# Patient Record
Sex: FEMALE | Race: BLACK OR AFRICAN AMERICAN | HISPANIC OR LATINO | URBAN - METROPOLITAN AREA
[De-identification: names, ages, dates, MRNs, and addresses within clinical notes are randomized per-mention and may not be internally consistent; named-entity substitution may affect disease eponyms.]

---

## 2022-05-30 ENCOUNTER — INPATIENT (INPATIENT)
Facility: HOSPITAL | Age: 27
LOS: 1 days | Discharge: HOME | DRG: 341 | End: 2022-06-01
Attending: SURGERY | Admitting: SURGERY
Payer: COMMERCIAL

## 2022-05-30 VITALS
HEART RATE: 68 BPM | OXYGEN SATURATION: 96 % | HEIGHT: 63 IN | DIASTOLIC BLOOD PRESSURE: 72 MMHG | SYSTOLIC BLOOD PRESSURE: 117 MMHG | WEIGHT: 192.02 LBS | RESPIRATION RATE: 18 BRPM | TEMPERATURE: 98 F

## 2022-05-30 LAB
ALBUMIN SERPL ELPH-MCNC: 4 G/DL — SIGNIFICANT CHANGE UP (ref 3.3–5)
ALBUMIN SERPL ELPH-MCNC: 4.6 G/DL — SIGNIFICANT CHANGE UP (ref 3.3–5)
ALP SERPL-CCNC: 78 U/L — SIGNIFICANT CHANGE UP (ref 40–120)
ALP SERPL-CCNC: SIGNIFICANT CHANGE UP (ref 40–120)
ALT FLD-CCNC: SIGNIFICANT CHANGE UP (ref 10–45)
ALT FLD-CCNC: SIGNIFICANT CHANGE UP (ref 10–45)
ANION GAP SERPL CALC-SCNC: 10 MMOL/L — SIGNIFICANT CHANGE UP (ref 5–17)
ANION GAP SERPL CALC-SCNC: 12 MMOL/L — SIGNIFICANT CHANGE UP (ref 5–17)
ANION GAP SERPL CALC-SCNC: 13 MMOL/L — SIGNIFICANT CHANGE UP (ref 5–17)
APPEARANCE UR: CLEAR — SIGNIFICANT CHANGE UP
APTT BLD: 30.3 SEC — SIGNIFICANT CHANGE UP (ref 27.5–35.5)
AST SERPL-CCNC: SIGNIFICANT CHANGE UP (ref 10–40)
AST SERPL-CCNC: SIGNIFICANT CHANGE UP (ref 10–40)
BASOPHILS # BLD AUTO: 0.04 K/UL — SIGNIFICANT CHANGE UP (ref 0–0.2)
BASOPHILS NFR BLD AUTO: 0.5 % — SIGNIFICANT CHANGE UP (ref 0–2)
BILIRUB SERPL-MCNC: 0.5 MG/DL — SIGNIFICANT CHANGE UP (ref 0.2–1.2)
BILIRUB SERPL-MCNC: 0.6 MG/DL — SIGNIFICANT CHANGE UP (ref 0.2–1.2)
BILIRUB UR-MCNC: NEGATIVE — SIGNIFICANT CHANGE UP
BLD GP AB SCN SERPL QL: NEGATIVE — SIGNIFICANT CHANGE UP
BUN SERPL-MCNC: 10 MG/DL — SIGNIFICANT CHANGE UP (ref 7–23)
BUN SERPL-MCNC: 8 MG/DL — SIGNIFICANT CHANGE UP (ref 7–23)
BUN SERPL-MCNC: 9 MG/DL — SIGNIFICANT CHANGE UP (ref 7–23)
CALCIUM SERPL-MCNC: 8.9 MG/DL — SIGNIFICANT CHANGE UP (ref 8.4–10.5)
CALCIUM SERPL-MCNC: 9.2 MG/DL — SIGNIFICANT CHANGE UP (ref 8.4–10.5)
CALCIUM SERPL-MCNC: 9.8 MG/DL — SIGNIFICANT CHANGE UP (ref 8.4–10.5)
CHLORIDE SERPL-SCNC: 102 MMOL/L — SIGNIFICANT CHANGE UP (ref 96–108)
CHLORIDE SERPL-SCNC: 104 MMOL/L — SIGNIFICANT CHANGE UP (ref 96–108)
CHLORIDE SERPL-SCNC: 104 MMOL/L — SIGNIFICANT CHANGE UP (ref 96–108)
CO2 SERPL-SCNC: 22 MMOL/L — SIGNIFICANT CHANGE UP (ref 22–31)
CO2 SERPL-SCNC: 24 MMOL/L — SIGNIFICANT CHANGE UP (ref 22–31)
CO2 SERPL-SCNC: 25 MMOL/L — SIGNIFICANT CHANGE UP (ref 22–31)
COLOR SPEC: YELLOW — SIGNIFICANT CHANGE UP
CREAT SERPL-MCNC: 0.63 MG/DL — SIGNIFICANT CHANGE UP (ref 0.5–1.3)
CREAT SERPL-MCNC: 0.66 MG/DL — SIGNIFICANT CHANGE UP (ref 0.5–1.3)
CREAT SERPL-MCNC: 0.69 MG/DL — SIGNIFICANT CHANGE UP (ref 0.5–1.3)
DIFF PNL FLD: NEGATIVE — SIGNIFICANT CHANGE UP
EGFR: 122 ML/MIN/1.73M2 — SIGNIFICANT CHANGE UP
EGFR: 123 ML/MIN/1.73M2 — SIGNIFICANT CHANGE UP
EGFR: 125 ML/MIN/1.73M2 — SIGNIFICANT CHANGE UP
EOSINOPHIL # BLD AUTO: 0.12 K/UL — SIGNIFICANT CHANGE UP (ref 0–0.5)
EOSINOPHIL NFR BLD AUTO: 1.5 % — SIGNIFICANT CHANGE UP (ref 0–6)
GLUCOSE SERPL-MCNC: 86 MG/DL — SIGNIFICANT CHANGE UP (ref 70–99)
GLUCOSE SERPL-MCNC: 90 MG/DL — SIGNIFICANT CHANGE UP (ref 70–99)
GLUCOSE SERPL-MCNC: 93 MG/DL — SIGNIFICANT CHANGE UP (ref 70–99)
GLUCOSE UR QL: NEGATIVE — SIGNIFICANT CHANGE UP
HCG UR QL: NEGATIVE — SIGNIFICANT CHANGE UP
HCT VFR BLD CALC: 40.1 % — SIGNIFICANT CHANGE UP (ref 34.5–45)
HGB BLD-MCNC: 13 G/DL — SIGNIFICANT CHANGE UP (ref 11.5–15.5)
IMM GRANULOCYTES NFR BLD AUTO: 0.1 % — SIGNIFICANT CHANGE UP (ref 0–1.5)
INR BLD: 1.17 — HIGH (ref 0.88–1.16)
KETONES UR-MCNC: ABNORMAL MG/DL
LEUKOCYTE ESTERASE UR-ACNC: NEGATIVE — SIGNIFICANT CHANGE UP
LIDOCAIN IGE QN: 15 U/L — SIGNIFICANT CHANGE UP (ref 7–60)
LYMPHOCYTES # BLD AUTO: 2.08 K/UL — SIGNIFICANT CHANGE UP (ref 1–3.3)
LYMPHOCYTES # BLD AUTO: 25.2 % — SIGNIFICANT CHANGE UP (ref 13–44)
MCHC RBC-ENTMCNC: 27.3 PG — SIGNIFICANT CHANGE UP (ref 27–34)
MCHC RBC-ENTMCNC: 32.4 GM/DL — SIGNIFICANT CHANGE UP (ref 32–36)
MCV RBC AUTO: 84.1 FL — SIGNIFICANT CHANGE UP (ref 80–100)
MONOCYTES # BLD AUTO: 0.83 K/UL — SIGNIFICANT CHANGE UP (ref 0–0.9)
MONOCYTES NFR BLD AUTO: 10.1 % — SIGNIFICANT CHANGE UP (ref 2–14)
NEUTROPHILS # BLD AUTO: 5.17 K/UL — SIGNIFICANT CHANGE UP (ref 1.8–7.4)
NEUTROPHILS NFR BLD AUTO: 62.6 % — SIGNIFICANT CHANGE UP (ref 43–77)
NITRITE UR-MCNC: NEGATIVE — SIGNIFICANT CHANGE UP
NRBC # BLD: 0 /100 WBCS — SIGNIFICANT CHANGE UP (ref 0–0)
PH UR: 6 — SIGNIFICANT CHANGE UP (ref 5–8)
PLATELET # BLD AUTO: 264 K/UL — SIGNIFICANT CHANGE UP (ref 150–400)
POTASSIUM SERPL-MCNC: 4.3 MMOL/L — SIGNIFICANT CHANGE UP (ref 3.5–5.3)
POTASSIUM SERPL-MCNC: SIGNIFICANT CHANGE UP (ref 3.5–5.3)
POTASSIUM SERPL-MCNC: SIGNIFICANT CHANGE UP (ref 3.5–5.3)
POTASSIUM SERPL-SCNC: 4.3 MMOL/L — SIGNIFICANT CHANGE UP (ref 3.5–5.3)
POTASSIUM SERPL-SCNC: SIGNIFICANT CHANGE UP (ref 3.5–5.3)
POTASSIUM SERPL-SCNC: SIGNIFICANT CHANGE UP (ref 3.5–5.3)
PROT SERPL-MCNC: 7.4 G/DL — SIGNIFICANT CHANGE UP (ref 6–8.3)
PROT SERPL-MCNC: 7.8 G/DL — SIGNIFICANT CHANGE UP (ref 6–8.3)
PROT UR-MCNC: NEGATIVE MG/DL — SIGNIFICANT CHANGE UP
PROTHROM AB SERPL-ACNC: 14 SEC — HIGH (ref 10.5–13.4)
RBC # BLD: 4.77 M/UL — SIGNIFICANT CHANGE UP (ref 3.8–5.2)
RBC # FLD: 14.2 % — SIGNIFICANT CHANGE UP (ref 10.3–14.5)
RH IG SCN BLD-IMP: POSITIVE — SIGNIFICANT CHANGE UP
SARS-COV-2 RNA SPEC QL NAA+PROBE: POSITIVE
SODIUM SERPL-SCNC: 138 MMOL/L — SIGNIFICANT CHANGE UP (ref 135–145)
SODIUM SERPL-SCNC: 138 MMOL/L — SIGNIFICANT CHANGE UP (ref 135–145)
SODIUM SERPL-SCNC: 140 MMOL/L — SIGNIFICANT CHANGE UP (ref 135–145)
SP GR SPEC: >=1.03 — SIGNIFICANT CHANGE UP (ref 1–1.03)
UROBILINOGEN FLD QL: 0.2 E.U./DL — SIGNIFICANT CHANGE UP
WBC # BLD: 8.25 K/UL — SIGNIFICANT CHANGE UP (ref 3.8–10.5)
WBC # FLD AUTO: 8.25 K/UL — SIGNIFICANT CHANGE UP (ref 3.8–10.5)

## 2022-05-30 PROCEDURE — 74176 CT ABD & PELVIS W/O CONTRAST: CPT | Mod: 26,MG

## 2022-05-30 PROCEDURE — 99285 EMERGENCY DEPT VISIT HI MDM: CPT

## 2022-05-30 PROCEDURE — G1004: CPT

## 2022-05-30 RX ORDER — HYDROMORPHONE HYDROCHLORIDE 2 MG/ML
0.5 INJECTION INTRAMUSCULAR; INTRAVENOUS; SUBCUTANEOUS EVERY 4 HOURS
Refills: 0 | Status: DISCONTINUED | OUTPATIENT
Start: 2022-05-30 | End: 2022-05-31

## 2022-05-30 RX ORDER — CEFTRIAXONE 500 MG/1
1000 INJECTION, POWDER, FOR SOLUTION INTRAMUSCULAR; INTRAVENOUS ONCE
Refills: 0 | Status: COMPLETED | OUTPATIENT
Start: 2022-05-30 | End: 2022-05-30

## 2022-05-30 RX ORDER — ACETAMINOPHEN 500 MG
1000 TABLET ORAL ONCE
Refills: 0 | Status: DISCONTINUED | OUTPATIENT
Start: 2022-05-30 | End: 2022-06-01

## 2022-05-30 RX ORDER — ONDANSETRON 8 MG/1
4 TABLET, FILM COATED ORAL EVERY 6 HOURS
Refills: 0 | Status: DISCONTINUED | OUTPATIENT
Start: 2022-05-30 | End: 2022-06-01

## 2022-05-30 RX ORDER — METRONIDAZOLE 500 MG
500 TABLET ORAL ONCE
Refills: 0 | Status: COMPLETED | OUTPATIENT
Start: 2022-05-30 | End: 2022-05-30

## 2022-05-30 RX ORDER — DIATRIZOATE MEGLUMINE 180 MG/ML
30 INJECTION, SOLUTION INTRAVESICAL ONCE
Refills: 0 | Status: COMPLETED | OUTPATIENT
Start: 2022-05-30 | End: 2022-05-30

## 2022-05-30 RX ORDER — INFLUENZA VIRUS VACCINE 15; 15; 15; 15 UG/.5ML; UG/.5ML; UG/.5ML; UG/.5ML
0.5 SUSPENSION INTRAMUSCULAR ONCE
Refills: 0 | Status: DISCONTINUED | OUTPATIENT
Start: 2022-05-30 | End: 2022-06-01

## 2022-05-30 RX ORDER — CETIRIZINE HYDROCHLORIDE 10 MG/1
1 TABLET ORAL
Qty: 0 | Refills: 0 | DISCHARGE

## 2022-05-30 RX ORDER — PIPERACILLIN AND TAZOBACTAM 4; .5 G/20ML; G/20ML
3.38 INJECTION, POWDER, LYOPHILIZED, FOR SOLUTION INTRAVENOUS EVERY 6 HOURS
Refills: 0 | Status: DISCONTINUED | OUTPATIENT
Start: 2022-05-30 | End: 2022-06-01

## 2022-05-30 RX ORDER — KETOROLAC TROMETHAMINE 30 MG/ML
15 SYRINGE (ML) INJECTION EVERY 8 HOURS
Refills: 0 | Status: DISCONTINUED | OUTPATIENT
Start: 2022-05-30 | End: 2022-05-31

## 2022-05-30 RX ORDER — HYDROMORPHONE HYDROCHLORIDE 2 MG/ML
0.25 INJECTION INTRAMUSCULAR; INTRAVENOUS; SUBCUTANEOUS EVERY 4 HOURS
Refills: 0 | Status: DISCONTINUED | OUTPATIENT
Start: 2022-05-30 | End: 2022-05-31

## 2022-05-30 RX ORDER — FLUTICASONE PROPIONATE 50 MCG
1 SPRAY, SUSPENSION NASAL
Qty: 0 | Refills: 0 | DISCHARGE

## 2022-05-30 RX ORDER — SODIUM CHLORIDE 9 MG/ML
1000 INJECTION INTRAMUSCULAR; INTRAVENOUS; SUBCUTANEOUS ONCE
Refills: 0 | Status: COMPLETED | OUTPATIENT
Start: 2022-05-30 | End: 2022-05-30

## 2022-05-30 RX ORDER — ONDANSETRON 8 MG/1
4 TABLET, FILM COATED ORAL ONCE
Refills: 0 | Status: COMPLETED | OUTPATIENT
Start: 2022-05-30 | End: 2022-05-30

## 2022-05-30 RX ORDER — SODIUM CHLORIDE 9 MG/ML
1000 INJECTION, SOLUTION INTRAVENOUS
Refills: 0 | Status: DISCONTINUED | OUTPATIENT
Start: 2022-05-30 | End: 2022-06-01

## 2022-05-30 RX ORDER — HEPARIN SODIUM 5000 [USP'U]/ML
5000 INJECTION INTRAVENOUS; SUBCUTANEOUS EVERY 8 HOURS
Refills: 0 | Status: DISCONTINUED | OUTPATIENT
Start: 2022-05-30 | End: 2022-06-01

## 2022-05-30 RX ADMIN — CEFTRIAXONE 100 MILLIGRAM(S): 500 INJECTION, POWDER, FOR SOLUTION INTRAMUSCULAR; INTRAVENOUS at 18:06

## 2022-05-30 RX ADMIN — HEPARIN SODIUM 5000 UNIT(S): 5000 INJECTION INTRAVENOUS; SUBCUTANEOUS at 21:27

## 2022-05-30 RX ADMIN — SODIUM CHLORIDE 1000 MILLILITER(S): 9 INJECTION INTRAMUSCULAR; INTRAVENOUS; SUBCUTANEOUS at 15:36

## 2022-05-30 RX ADMIN — ONDANSETRON 4 MILLIGRAM(S): 8 TABLET, FILM COATED ORAL at 15:36

## 2022-05-30 RX ADMIN — Medication 100 MILLIGRAM(S): at 18:51

## 2022-05-30 RX ADMIN — SODIUM CHLORIDE 120 MILLILITER(S): 9 INJECTION, SOLUTION INTRAVENOUS at 19:57

## 2022-05-30 RX ADMIN — PIPERACILLIN AND TAZOBACTAM 200 GRAM(S): 4; .5 INJECTION, POWDER, LYOPHILIZED, FOR SOLUTION INTRAVENOUS at 22:17

## 2022-05-30 RX ADMIN — DIATRIZOATE MEGLUMINE 30 MILLILITER(S): 180 INJECTION, SOLUTION INTRAVESICAL at 15:36

## 2022-05-30 NOTE — H&P ADULT - NSHPPHYSICALEXAM_GEN_ALL_CORE
VITALS & I/Os:  Vital Signs Last 24 Hrs  T(C): 37.1 (30 May 2022 18:01), Max: 37.1 (30 May 2022 18:01)  T(F): 98.7 (30 May 2022 18:01), Max: 98.7 (30 May 2022 18:01)  HR: 63 (30 May 2022 18:01) (63 - 68)  BP: 114/71 (30 May 2022 18:01) (114/71 - 117/72)  BP(mean): --  RR: 18 (30 May 2022 18:01) (18 - 18)  SpO2: 100% (30 May 2022 18:01) (96% - 100%)    I&O's Summary      PHYSICAL EXAM:  General: No acute distress   Respiratory: Nonlabored  Cardiovascular: normotensive, regular rate  Abdominal: Soft, nondistended, tenderness RLQ. No rebound or guarding. No organomegaly, no palpable mass.  Extremities: Warm, no edema

## 2022-05-30 NOTE — H&P ADULT - ASSESSMENT
26F with no PMH/PSHx a/w clinical and radiographic evidence of uncomplicated acute appendicitis. COVID POSITIVE     Plan:   - No surgical intervention at the moment will continue antibiotic management   - Admit to General Surgery Team 4 - Dr. Hernandez  - Pain/nausea control PRN  - NPO/IVF  - Ceftriaxone/Flagyl  - OOBA/SQH/SCDs  - Preoperative labs    Plan discussed with Dr. Hernandez and chief resident

## 2022-05-30 NOTE — ED PROVIDER NOTE - NS ED ROS FT
General: no fever, chills, confusion  Cardiac: no chest pain, chest tightness, palpitations  Lungs: no sob, difficulty breathing  Abdomen: no constipation +abdominal pain, N/V/D, anorexia   : no dysuria, urinary frequency/urgency    All other systems negative except as per HPI

## 2022-05-30 NOTE — ED PROVIDER NOTE - ATTENDING APP SHARED VISIT CONTRIBUTION OF CARE
(0) independent Pt is a 27yo f, who p/w periumbilical pain starting 2d ago which migrated to rlq. + n/v, diarrhea, anorexia. No fever/ chills, urinary sx's. AVSS. PE as above w/ periumbilical and rlq tenderness. No leukocytosis. CT a/p + for acute appy. Pt ordered for ceftriaxone/ flagyl. Surg consult obtained and pt admitted for iv abx/ appendectomy.

## 2022-05-30 NOTE — ED PROVIDER NOTE - NS ED MD DISPO SPECIAL CONSIDERATION1
None Pt calm and cooperative at this time  No need for 1:1  Continue psychotropic medications with daily EKG monitoring to assess QT

## 2022-05-30 NOTE — ED PROVIDER NOTE - CLINICAL SUMMARY MEDICAL DECISION MAKING FREE TEXT BOX
27 y/o female with no pmhx c/o abdominal pain with n/v/d and anorexia with + ttp to RLQ concerning for appy. CT conducted showing early acute appy. VS and labs wnml. Discussed with surgery, plan for admission for further evaluation. No pelvic pain and no ua symptoms.

## 2022-05-30 NOTE — ED ADULT NURSE NOTE - OBJECTIVE STATEMENT
25 y/o female c/o intermittent abdominal pain around the umbilical area x 2 days but worsening last night.

## 2022-05-30 NOTE — ED PROVIDER NOTE - OBJECTIVE STATEMENT
25 y/o F with no PMHx or PSHx presents to ED with c/o intermittent abdominal pain that began gradually 2 days ago. Pain was initially located around her belly button and has increased in frequency and intensity. Now pain has localized to right side of abdomen. Pt with associated anorexia and N/V/D. Pt is here today because despite taking tylenol, pain has continued. Denies fever, chills, chest pain, SOB, urinary symptoms, or recent falls or trauma. Also denies recent abx use, new medication, recent travel, sick contacts, or recent illness.

## 2022-05-30 NOTE — ED PROVIDER NOTE - PHYSICAL EXAMINATION
CONSTITUTIONAL: Well-developed; well-nourished; in no acute distress.  SKIN: Warm and dry, no acute rash.  HEAD: Normocephalic; atraumatic.  EYES: PERRL, EOM intact; conjunctiva and sclera clear.  ENT: No nasal discharge; airway clear.  NECK: Supple; non tender.  CARD: S1, S2 normal; no murmurs, gallops, or rubs. Regular rate and rhythm.  RESP: No wheezes, rales or rhonchi.  ABD: Normal bowel sounds; soft; non-distended; right medial abdomen with tenderness to palpation; no rebound or guarding; no hepatosplenomegaly; no CVA tenderness.   EXT: Normal ROM. No clubbing, cyanosis or edema.  LYMPH: No acute cervical adenopathy.  NEURO: Alert, oriented. Grossly unremarkable.  PSYCH: Cooperative, appropriate.

## 2022-05-30 NOTE — PATIENT PROFILE ADULT - FALL HARM RISK - UNIVERSAL INTERVENTIONS
Bed in lowest position, wheels locked, appropriate side rails in place/Call bell, personal items and telephone in reach/Instruct patient to call for assistance before getting out of bed or chair/Non-slip footwear when patient is out of bed/[place] to call system/Physically safe environment - no spills, clutter or unnecessary equipment/Purposeful Proactive Rounding/Room/bathroom lighting operational, light cord in reach

## 2022-05-30 NOTE — H&P ADULT - HISTORY OF PRESENT ILLNESS
Mrs. Feldman is a healthy 25 y/o F, comes with 3 days of abdominal pain, started in epigastrium and now periumbilical. Yesterday she had nausea, nonbloody nonbilious emesis, subjective fever and nonbloody diarrhea. She had dry coughing on Saturday and mild shortness of breath. No chest pain. She feels hungry. Last meal: 4 pm yesterday.   Of note, patient had a boot on the left leg for "swollen tendon in the ankle", no trauma or fracture.   ED: afebrile, normotensive. COVID +, no leukocytosis, no anemia, no electrolyte abnormality, pending K and LFTs. CT abdomen 1. There is a dilated fluid-filled appendix measuring 1.0 cm without significant periappendiceal fat stranding (series 3 image ). These findings may represent early acute appendicitis.  2. Mild right lower quadrant mesenteric lymphadenopathy.    PMH: none   PSH: none   Meds: Flonase, Zyrtec   GYN: , LPM    FH: grandmother DM2   SH: never smoker, no alcohol consumption, no IVDU, no marihuana use.

## 2022-05-30 NOTE — H&P ADULT - NSHPLABSRESULTS_GEN_ALL_CORE
LABS:                        13.0   8.25  )-----------( 264      ( 30 May 2022 15:52 )             40.1     05-30    138  |  104  |  8   ----------------------------<  93  4.3   |  24  |  0.66    Ca    9.2      30 May 2022 18:35    TPro  7.4  /  Alb  4.0  /  TBili  0.5  /  DBili  x   /  AST  See Note  /  ALT  See Note  /  AlkPhos  See Note  05-30    Lactate:    PT/INR - ( 30 May 2022 15:52 )   PT: 14.0 sec;   INR: 1.17          PTT - ( 30 May 2022 15:52 )  PTT:30.3 sec    Urinalysis Basic - ( 30 May 2022 15:52 )    Color: Yellow / Appearance: Clear / SG: >=1.030 / pH: x  Gluc: x / Ketone: Trace mg/dL  / Bili: Negative / Urobili: 0.2 E.U./dL   Blood: x / Protein: NEGATIVE mg/dL / Nitrite: NEGATIVE   Leuk Esterase: NEGATIVE / RBC: x / WBC x   Sq Epi: x / Non Sq Epi: x / Bacteria: x    IMAGING:  CT ABDOMEN AND PELVIS OC  PROCEDURE DATE: 05/30/2022  FINDINGS:  LOWER CHEST: Within normal limits.  LIVER: Within normal limits.  BILE DUCTS: Normal caliber.  GALLBLADDER: Within normal limits.  SPLEEN: Within normal limits.  PANCREAS: Within normal limits.  ADRENALS: Within normal limits.  KIDNEYS/URETERS: Within normal limits.  BLADDER: Mildly distended.  REPRODUCTIVE ORGANS: No pelvic masses.  BOWEL: No bowel obstruction. No free air or abscess. There is a dilated fluid-filled appendix measuring 1.0 cm without significant periappendiceal fat stranding (series 3 image ), suspicious for early acute appendicitis.  PERITONEUM: No ascites.  VESSELS: Within normal limits.  RETROPERITONEUM/LYMPH NODES: There are multiple mildly prominent right lower quadrant mesenteric lymph nodes, largest measuring 0.7 cm..  ABDOMINAL WALL: Within normal limits.  BONES: Within normal limits.  IMPRESSION:  1. There is a dilated fluid-filled appendix measuring 1.0 cm without significant periappendiceal fat stranding (series 3 image ). These findings may represent early acute appendicitis.  2. Mild right lower quadrant mesenteric lymphadenopathy.

## 2022-05-31 LAB
ANION GAP SERPL CALC-SCNC: 12 MMOL/L — SIGNIFICANT CHANGE UP (ref 5–17)
BLD GP AB SCN SERPL QL: NEGATIVE — SIGNIFICANT CHANGE UP
BUN SERPL-MCNC: 7 MG/DL — SIGNIFICANT CHANGE UP (ref 7–23)
CALCIUM SERPL-MCNC: 9 MG/DL — SIGNIFICANT CHANGE UP (ref 8.4–10.5)
CHLORIDE SERPL-SCNC: 103 MMOL/L — SIGNIFICANT CHANGE UP (ref 96–108)
CO2 SERPL-SCNC: 24 MMOL/L — SIGNIFICANT CHANGE UP (ref 22–31)
CREAT SERPL-MCNC: 0.76 MG/DL — SIGNIFICANT CHANGE UP (ref 0.5–1.3)
EGFR: 110 ML/MIN/1.73M2 — SIGNIFICANT CHANGE UP
GLUCOSE SERPL-MCNC: 80 MG/DL — SIGNIFICANT CHANGE UP (ref 70–99)
HCT VFR BLD CALC: 36 % — SIGNIFICANT CHANGE UP (ref 34.5–45)
HGB BLD-MCNC: 11.4 G/DL — LOW (ref 11.5–15.5)
MAGNESIUM SERPL-MCNC: 1.9 MG/DL — SIGNIFICANT CHANGE UP (ref 1.6–2.6)
MCHC RBC-ENTMCNC: 26.5 PG — LOW (ref 27–34)
MCHC RBC-ENTMCNC: 31.7 GM/DL — LOW (ref 32–36)
MCV RBC AUTO: 83.7 FL — SIGNIFICANT CHANGE UP (ref 80–100)
NRBC # BLD: 0 /100 WBCS — SIGNIFICANT CHANGE UP (ref 0–0)
PHOSPHATE SERPL-MCNC: 3.4 MG/DL — SIGNIFICANT CHANGE UP (ref 2.5–4.5)
PLATELET # BLD AUTO: 248 K/UL — SIGNIFICANT CHANGE UP (ref 150–400)
POTASSIUM SERPL-MCNC: 3.7 MMOL/L — SIGNIFICANT CHANGE UP (ref 3.5–5.3)
POTASSIUM SERPL-SCNC: 3.7 MMOL/L — SIGNIFICANT CHANGE UP (ref 3.5–5.3)
RBC # BLD: 4.3 M/UL — SIGNIFICANT CHANGE UP (ref 3.8–5.2)
RBC # FLD: 14.4 % — SIGNIFICANT CHANGE UP (ref 10.3–14.5)
RH IG SCN BLD-IMP: POSITIVE — SIGNIFICANT CHANGE UP
SODIUM SERPL-SCNC: 139 MMOL/L — SIGNIFICANT CHANGE UP (ref 135–145)
WBC # BLD: 5.31 K/UL — SIGNIFICANT CHANGE UP (ref 3.8–10.5)
WBC # FLD AUTO: 5.31 K/UL — SIGNIFICANT CHANGE UP (ref 3.8–10.5)

## 2022-05-31 PROCEDURE — 88304 TISSUE EXAM BY PATHOLOGIST: CPT | Mod: 26

## 2022-05-31 PROCEDURE — 99255 IP/OBS CONSLTJ NEW/EST HI 80: CPT | Mod: GC

## 2022-05-31 RX ORDER — OXYCODONE HYDROCHLORIDE 5 MG/1
2.5 TABLET ORAL EVERY 6 HOURS
Refills: 0 | Status: DISCONTINUED | OUTPATIENT
Start: 2022-05-31 | End: 2022-05-31

## 2022-05-31 RX ORDER — ACETAMINOPHEN 500 MG
650 TABLET ORAL EVERY 6 HOURS
Refills: 0 | Status: DISCONTINUED | OUTPATIENT
Start: 2022-05-31 | End: 2022-06-01

## 2022-05-31 RX ORDER — MAGNESIUM SULFATE 500 MG/ML
1 VIAL (ML) INJECTION ONCE
Refills: 0 | Status: COMPLETED | OUTPATIENT
Start: 2022-05-31 | End: 2022-05-31

## 2022-05-31 RX ORDER — POTASSIUM CHLORIDE 20 MEQ
10 PACKET (EA) ORAL
Refills: 0 | Status: COMPLETED | OUTPATIENT
Start: 2022-05-31 | End: 2022-05-31

## 2022-05-31 RX ORDER — OXYCODONE HYDROCHLORIDE 5 MG/1
5 TABLET ORAL EVERY 6 HOURS
Refills: 0 | Status: DISCONTINUED | OUTPATIENT
Start: 2022-05-31 | End: 2022-06-01

## 2022-05-31 RX ADMIN — HYDROMORPHONE HYDROCHLORIDE 0.5 MILLIGRAM(S): 2 INJECTION INTRAMUSCULAR; INTRAVENOUS; SUBCUTANEOUS at 21:20

## 2022-05-31 RX ADMIN — Medication 100 MILLIEQUIVALENT(S): at 13:18

## 2022-05-31 RX ADMIN — HEPARIN SODIUM 5000 UNIT(S): 5000 INJECTION INTRAVENOUS; SUBCUTANEOUS at 21:43

## 2022-05-31 RX ADMIN — HEPARIN SODIUM 5000 UNIT(S): 5000 INJECTION INTRAVENOUS; SUBCUTANEOUS at 13:18

## 2022-05-31 RX ADMIN — PIPERACILLIN AND TAZOBACTAM 200 GRAM(S): 4; .5 INJECTION, POWDER, LYOPHILIZED, FOR SOLUTION INTRAVENOUS at 23:05

## 2022-05-31 RX ADMIN — HYDROMORPHONE HYDROCHLORIDE 0.5 MILLIGRAM(S): 2 INJECTION INTRAMUSCULAR; INTRAVENOUS; SUBCUTANEOUS at 20:56

## 2022-05-31 RX ADMIN — PIPERACILLIN AND TAZOBACTAM 200 GRAM(S): 4; .5 INJECTION, POWDER, LYOPHILIZED, FOR SOLUTION INTRAVENOUS at 04:43

## 2022-05-31 RX ADMIN — Medication 650 MILLIGRAM(S): at 23:06

## 2022-05-31 RX ADMIN — SODIUM CHLORIDE 120 MILLILITER(S): 9 INJECTION, SOLUTION INTRAVENOUS at 13:23

## 2022-05-31 RX ADMIN — HEPARIN SODIUM 5000 UNIT(S): 5000 INJECTION INTRAVENOUS; SUBCUTANEOUS at 05:00

## 2022-05-31 RX ADMIN — PIPERACILLIN AND TAZOBACTAM 200 GRAM(S): 4; .5 INJECTION, POWDER, LYOPHILIZED, FOR SOLUTION INTRAVENOUS at 16:52

## 2022-05-31 RX ADMIN — PIPERACILLIN AND TAZOBACTAM 200 GRAM(S): 4; .5 INJECTION, POWDER, LYOPHILIZED, FOR SOLUTION INTRAVENOUS at 09:36

## 2022-05-31 RX ADMIN — Medication 100 MILLIEQUIVALENT(S): at 11:27

## 2022-05-31 RX ADMIN — Medication 100 MILLIEQUIVALENT(S): at 10:15

## 2022-05-31 RX ADMIN — Medication 100 GRAM(S): at 14:50

## 2022-05-31 NOTE — BRIEF OPERATIVE NOTE - OPERATION/FINDINGS
Appendix identified (non-perforated, non-gangrenous). Cecum bluntly mobilized. Mesoappendix divided using electrocautery. Appendix amputated at base near cecum using ligasure. Stump inspected laparoscopically, endoloop x2 placed around stump. Fascia closed w/ Vicryl sutures. Hemostasis achieved

## 2022-05-31 NOTE — PACU DISCHARGE NOTE - COMMENTS
Patient met PACU criteria, abdominal lap sites x3 with steri strips intact, tolerating sips of clears well, airborne and contact precautions maintained, report endorsed to ANTONIO Luque

## 2022-05-31 NOTE — CONSULT NOTE ADULT - ASSESSMENT
26 year old woman with minimal past medical history, admitted for acute appendicitis. Incidentally found to be COVID positive     # Acute appendicitis  - empiric antibiotics   - pain control   - planned for OR     # Pre-operative evaluation   METS >4 by Duke Activity Status Index (DASI) , able to walk up >1 flight of stairs without stopping.   RCRI Class II Risk  Using the VILLEGAS quinton-operative risk index, patient has a <1% risk of myocardial infarction or cardiac arrest, intraoperatively or up to 30 days post-op  Assuming intra-peritoneal surgery, patient is at low risk for an intermediate risk procedure.   Patient may proceed to surgery without further cardiac evaluation if surgery is indicated.   Please notify co-management hospitalist if patient's clinical status changes.     # COVID19 infection  Incidentally found to be COVID19 + at time of admission   Had cough on Saturday, which has since resolved. no sore throat.   At this point, largely  asymptomatic from COVID. Very low risk for progression to severe COVID . does not meet criteria for monoclonal antibodies   VTE IMPROVE score low . Does not meet crtieria for extended VTE ppx after discharge.     #Obesity  BMI: BMI (kg/m2): 34 (05-31-22 @ 15:02)  Affects all aspects of care. Dose medications by weight     # DVT ppx - heparin subq while inpatient

## 2022-05-31 NOTE — CONSULT NOTE ADULT - SUBJECTIVE AND OBJECTIVE BOX
HPI:  Mrs. Feldman is a healthy 27 y/o F, comes with 3 days of abdominal pain, started in epigastrium and now periumbilical. Yesterday she had nausea, nonbloody nonbilious emesis, subjective fever and nonbloody diarrhea. She had dry coughing on Saturday and mild shortness of breath. No chest pain. She feels hungry. Last meal: 4 pm yesterday.   Of note, patient had a boot on the left leg for "swollen tendon in the ankle", no trauma or fracture.   ED: afebrile, normotensive. COVID +, no leukocytosis, no anemia, no electrolyte abnormality, pending K and LFTs. CT abdomen 1. There is a dilated fluid-filled appendix measuring 1.0 cm without significant periappendiceal fat stranding (series 3 image ). These findings may represent early acute appendicitis.  2. Mild right lower quadrant mesenteric lymphadenopathy.    At time of evaluation by internal medicine service this afternoon, had some improvement in symptoms after receiving pain medication and empiric antibiotics       PMH: none   PSH: none   Meds: Flonase, Zyrtec   GYN: , LPM    FH: grandmother DM2   SH: never smoker, no alcohol consumption, no IVDU, no marihuana use.            (30 May 2022 19:38)      PAST MEDICAL & SURGICAL HISTORY:  No pertinent past medical history      No significant past surgical history          Home Medications:  Flonase 50 mcg/inh nasal spray: 1 spray(s) nasal once a day (30 May 2022 19:56)  ZyrTEC 10 mg oral tablet: 1 tab(s) orally once a day, As Needed (30 May 2022 19:56)      Allergies    No Known Allergies    Intolerances        FAMILY HISTORY:      Social History:      REVIEW OF SYSTEMS:  CONSTITUTIONAL: No weight loss  EYES: No eye pain, or discharge  ENMT:  No tinnitus, vertigo  NECK: No pain or stiffness  RESPIRATORY: + cough on Saturday (3 days ago) No dyspnea  CARDIOVASCULAR: No chest pain, or leg swelling  GASTROINTESTINAL: Abdominal symptoms as per HPI.   GENITOURINARY: No dysuria, frequency, or hematuria  NEUROLOGICAL: No numbness, or tremors  SKIN: No itching, burning, rashes, or lesions   ENDOCRINE: No heat or cold intolerance;  MUSCULOSKELETAL: No joint pain or swelling;   PSYCHIATRIC: No mood swings, or difficulty sleeping  HEME/LYMPH: No easy bruising, or bleeding gums  ALLERGY AND IMMUNOLOGIC: No hives or eczema    Diet, Clear Liquid (22 @ 19:51) [Active]      CURRENT MEDICATIONS:   acetaminophen     Tablet .. 650 milliGRAM(s) Oral every 6 hours  acetaminophen   IVPB .. 1000 milliGRAM(s) IV Intermittent once PRN  heparin   Injectable 5000 Unit(s) SubCutaneous every 8 hours  influenza   Vaccine 0.5 milliLiter(s) IntraMuscular once  lactated ringers. 1000 milliLiter(s) IV Continuous <Continuous>  ondansetron Injectable 4 milliGRAM(s) IV Push every 6 hours PRN  oxyCODONE    IR 5 milliGRAM(s) Oral every 6 hours PRN  piperacillin/tazobactam IVPB.. 3.375 Gram(s) IV Intermittent every 6 hours      VITAL SIGNS, INS/OUTS (last 24 hours):  Vital Signs Last 24 Hrs  T(C): 36.4 (31 May 2022 22:00), Max: 36.6 (31 May 2022 05:17)  T(F): 97.6 (31 May 2022 22:00), Max: 97.9 (31 May 2022 12:43)  HR: 51 (31 May 2022 22:00) (46 - 65)  BP: 116/78 (31 May 2022 22:00) (114/75 - 148/93)  BP(mean): 95 (31 May 2022 21:35) (89 - 115)  RR: 17 (31 May 2022 22:00) (15 - 25)  SpO2: 100% (31 May 2022 22:00) (98% - 100%)  I&O's Summary    30 May 2022 07:01  -  31 May 2022 07:00  --------------------------------------------------------  IN: 760 mL / OUT: 300 mL / NET: 460 mL    31 May 2022 07:01  -  2022 05:10  --------------------------------------------------------  IN: 1440 mL / OUT: 1525 mL / NET: -85 mL        PHYSICAL EXAM:  Gen: Sitting in bed at time of exam, appears stated age  HEENT: NCAT, MMM, clear OP  Neck: supple, trachea at midline  CV: RRR, +S1/S2  Pulm: adequate respiratory effort, no increase in work of breathing  Abd: soft, ND  Skin: warm and dry, no new rashes vs prior report  Ext: WWP, no LE edema  Neuro: AOx3, no gross focal neurological deficits  Psych: affect and behavior appropriate, pleasant at time of interview    BASIC LABS:                        11.4   5.31  )-----------( 248      ( 31 May 2022 07:30 )             36.0         139  |  103  |  7   ----------------------------<  80  3.7   |  24  |  0.76    Ca    9.0      31 May 2022 07:30  Phos  3.4       Mg     1.9         TPro  7.4  /  Alb  4.0  /  TBili  0.5  /  DBili  x   /  AST  See Note  /  ALT  See Note  /  AlkPhos  See Note      PT/INR - ( 30 May 2022 15:52 )   PT: 14.0 sec;   INR: 1.17          PTT - ( 30 May 2022 15:52 )  PTT:30.3 sec  Urinalysis Basic - ( 30 May 2022 15:52 )    Color: Yellow / Appearance: Clear / SG: >=1.030 / pH: x  Gluc: x / Ketone: Trace mg/dL  / Bili: Negative / Urobili: 0.2 E.U./dL   Blood: x / Protein: NEGATIVE mg/dL / Nitrite: NEGATIVE   Leuk Esterase: NEGATIVE / RBC: x / WBC x   Sq Epi: x / Non Sq Epi: x / Bacteria: x      CAPILLARY BLOOD GLUCOSE          OTHER LABS:        MICRODATA:    Culture - Urine (collected 30 May 2022 15:59)  Source: Clean Catch Clean Catch (Midstream)  Preliminary Report (31 May 2022 09:13):    Culture in progress        IMAGING:    EKG:    #Diet - Diet, Clear Liquid (22 @ 19:51) [Active]        #DVT PPx -

## 2022-06-01 VITALS
OXYGEN SATURATION: 100 % | TEMPERATURE: 98 F | RESPIRATION RATE: 18 BRPM | SYSTOLIC BLOOD PRESSURE: 116 MMHG | DIASTOLIC BLOOD PRESSURE: 75 MMHG | HEART RATE: 56 BPM

## 2022-06-01 DIAGNOSIS — U07.1 COVID-19: ICD-10-CM

## 2022-06-01 DIAGNOSIS — E66.9 OBESITY, UNSPECIFIED: ICD-10-CM

## 2022-06-01 DIAGNOSIS — K35.80 UNSPECIFIED ACUTE APPENDICITIS: ICD-10-CM

## 2022-06-01 DIAGNOSIS — Z01.818 ENCOUNTER FOR OTHER PREPROCEDURAL EXAMINATION: ICD-10-CM

## 2022-06-01 LAB
ANION GAP SERPL CALC-SCNC: 14 MMOL/L — SIGNIFICANT CHANGE UP (ref 5–17)
BUN SERPL-MCNC: 7 MG/DL — SIGNIFICANT CHANGE UP (ref 7–23)
CALCIUM SERPL-MCNC: 9.7 MG/DL — SIGNIFICANT CHANGE UP (ref 8.4–10.5)
CHLORIDE SERPL-SCNC: 102 MMOL/L — SIGNIFICANT CHANGE UP (ref 96–108)
CO2 SERPL-SCNC: 20 MMOL/L — LOW (ref 22–31)
CREAT SERPL-MCNC: 0.63 MG/DL — SIGNIFICANT CHANGE UP (ref 0.5–1.3)
CULTURE RESULTS: SIGNIFICANT CHANGE UP
EGFR: 125 ML/MIN/1.73M2 — SIGNIFICANT CHANGE UP
GLUCOSE SERPL-MCNC: 98 MG/DL — SIGNIFICANT CHANGE UP (ref 70–99)
HCT VFR BLD CALC: 42.3 % — SIGNIFICANT CHANGE UP (ref 34.5–45)
HGB BLD-MCNC: 13.3 G/DL — SIGNIFICANT CHANGE UP (ref 11.5–15.5)
MAGNESIUM SERPL-MCNC: 2.1 MG/DL — SIGNIFICANT CHANGE UP (ref 1.6–2.6)
MCHC RBC-ENTMCNC: 26.7 PG — LOW (ref 27–34)
MCHC RBC-ENTMCNC: 31.4 GM/DL — LOW (ref 32–36)
MCV RBC AUTO: 84.8 FL — SIGNIFICANT CHANGE UP (ref 80–100)
NRBC # BLD: 0 /100 WBCS — SIGNIFICANT CHANGE UP (ref 0–0)
PHOSPHATE SERPL-MCNC: 4.2 MG/DL — SIGNIFICANT CHANGE UP (ref 2.5–4.5)
PLATELET # BLD AUTO: 240 K/UL — SIGNIFICANT CHANGE UP (ref 150–400)
POTASSIUM SERPL-MCNC: 4.6 MMOL/L — SIGNIFICANT CHANGE UP (ref 3.5–5.3)
POTASSIUM SERPL-SCNC: 4.6 MMOL/L — SIGNIFICANT CHANGE UP (ref 3.5–5.3)
RBC # BLD: 4.99 M/UL — SIGNIFICANT CHANGE UP (ref 3.8–5.2)
RBC # FLD: 14.3 % — SIGNIFICANT CHANGE UP (ref 10.3–14.5)
SODIUM SERPL-SCNC: 136 MMOL/L — SIGNIFICANT CHANGE UP (ref 135–145)
SPECIMEN SOURCE: SIGNIFICANT CHANGE UP
WBC # BLD: 7.81 K/UL — SIGNIFICANT CHANGE UP (ref 3.8–10.5)
WBC # FLD AUTO: 7.81 K/UL — SIGNIFICANT CHANGE UP (ref 3.8–10.5)

## 2022-06-01 PROCEDURE — 83735 ASSAY OF MAGNESIUM: CPT

## 2022-06-01 PROCEDURE — 86850 RBC ANTIBODY SCREEN: CPT

## 2022-06-01 PROCEDURE — 86901 BLOOD TYPING SEROLOGIC RH(D): CPT

## 2022-06-01 PROCEDURE — 74176 CT ABD & PELVIS W/O CONTRAST: CPT | Mod: MG

## 2022-06-01 PROCEDURE — 87086 URINE CULTURE/COLONY COUNT: CPT

## 2022-06-01 PROCEDURE — 96375 TX/PRO/DX INJ NEW DRUG ADDON: CPT

## 2022-06-01 PROCEDURE — 80053 COMPREHEN METABOLIC PANEL: CPT

## 2022-06-01 PROCEDURE — 81003 URINALYSIS AUTO W/O SCOPE: CPT

## 2022-06-01 PROCEDURE — 83690 ASSAY OF LIPASE: CPT

## 2022-06-01 PROCEDURE — G1004: CPT

## 2022-06-01 PROCEDURE — 36415 COLL VENOUS BLD VENIPUNCTURE: CPT

## 2022-06-01 PROCEDURE — 88304 TISSUE EXAM BY PATHOLOGIST: CPT

## 2022-06-01 PROCEDURE — 84100 ASSAY OF PHOSPHORUS: CPT

## 2022-06-01 PROCEDURE — 96374 THER/PROPH/DIAG INJ IV PUSH: CPT

## 2022-06-01 PROCEDURE — 85730 THROMBOPLASTIN TIME PARTIAL: CPT

## 2022-06-01 PROCEDURE — 81025 URINE PREGNANCY TEST: CPT

## 2022-06-01 PROCEDURE — 85610 PROTHROMBIN TIME: CPT

## 2022-06-01 PROCEDURE — 99285 EMERGENCY DEPT VISIT HI MDM: CPT

## 2022-06-01 PROCEDURE — 86900 BLOOD TYPING SEROLOGIC ABO: CPT

## 2022-06-01 PROCEDURE — 87635 SARS-COV-2 COVID-19 AMP PRB: CPT

## 2022-06-01 PROCEDURE — 80048 BASIC METABOLIC PNL TOTAL CA: CPT

## 2022-06-01 PROCEDURE — 85025 COMPLETE CBC W/AUTO DIFF WBC: CPT

## 2022-06-01 PROCEDURE — 85027 COMPLETE CBC AUTOMATED: CPT

## 2022-06-01 RX ORDER — OXYCODONE HYDROCHLORIDE 5 MG/1
1 TABLET ORAL
Qty: 12 | Refills: 0
Start: 2022-06-01 | End: 2022-06-03

## 2022-06-01 RX ORDER — DOCUSATE SODIUM 100 MG
1 CAPSULE ORAL
Qty: 6 | Refills: 0
Start: 2022-06-01 | End: 2022-06-03

## 2022-06-01 RX ADMIN — HEPARIN SODIUM 5000 UNIT(S): 5000 INJECTION INTRAVENOUS; SUBCUTANEOUS at 05:57

## 2022-06-01 RX ADMIN — Medication 650 MILLIGRAM(S): at 05:57

## 2022-06-01 RX ADMIN — PIPERACILLIN AND TAZOBACTAM 200 GRAM(S): 4; .5 INJECTION, POWDER, LYOPHILIZED, FOR SOLUTION INTRAVENOUS at 05:59

## 2022-06-01 RX ADMIN — Medication 650 MILLIGRAM(S): at 12:21

## 2022-06-01 RX ADMIN — HEPARIN SODIUM 5000 UNIT(S): 5000 INJECTION INTRAVENOUS; SUBCUTANEOUS at 13:46

## 2022-06-01 RX ADMIN — PIPERACILLIN AND TAZOBACTAM 200 GRAM(S): 4; .5 INJECTION, POWDER, LYOPHILIZED, FOR SOLUTION INTRAVENOUS at 10:23

## 2022-06-01 RX ADMIN — Medication 650 MILLIGRAM(S): at 00:15

## 2022-06-01 RX ADMIN — Medication 650 MILLIGRAM(S): at 06:47

## 2022-06-01 NOTE — DISCHARGE NOTE PROVIDER - NSDCMRMEDTOKEN_GEN_ALL_CORE_FT
amoxicillin-clavulanate 875 mg-125 mg oral tablet: 1 tab(s) orally every 12 hours   Colace 100 mg oral capsule: 1 cap(s) orally 2 times a day, As Needed - for constipation   Flonase 50 mcg/inh nasal spray: 1 spray(s) nasal once a day  oxyCODONE 5 mg oral tablet: 1 tab(s) orally every 6 hours, As Needed -for severe pain MDD:4  ZyrTEC 10 mg oral tablet: 1 tab(s) orally once a day, As Needed

## 2022-06-01 NOTE — DISCHARGE NOTE PROVIDER - HOSPITAL COURSE
26F COVID+ with no PMH/PSHx a/w clinical and radiographic evidence of uncomplicated acute appendicitis. s/p lap appendectomy (5/31). Patient's post-operative course was uncomplicated. Diet was advanced as tolerated and pain was well controlled on medication. On day of discharge, pt deemed stable and ready to return home with plan to follow up as an outpatient.

## 2022-06-01 NOTE — DISCHARGE NOTE NURSING/CASE MANAGEMENT/SOCIAL WORK - NSDCPEFALRISK_GEN_ALL_CORE
For information on Fall & Injury Prevention, visit: [email or web address] OR  [email or web address] OR  https://www.cdc.gov/steadi/patient.html

## 2022-06-01 NOTE — PROGRESS NOTE ADULT - ASSESSMENT
26F COVID+ with no PMH/PSHx a/w clinical and radiographic evidence of uncomplicated acute appendicitis.     - NPO/IVF  - Pain/nausea control PRN  - Zosyn  - OOBA/SQH/SCDs  - AM labs  
26F COVID+ with no PMH/PSHx a/w clinical and radiographic evidence of uncomplicated acute appendicitis. s/p lap appendectomy (5/31).    - CLD/IVF  - Pain/nausea control PRN  - Zosyn  - dc [place]  - OOBA/SQH/SCDs  - AM labs  
26F COVID+ with no PMH/PSHx a/w clinical and radiographic evidence of uncomplicated acute appendicitis. s/p lap appy (5/31). Recovering w/o any complications. Pain controlled. Tolerating CLD. AVSS. Good UOP.    - CLD/IVF  - Pain/nausea control PRN  - Zosyn  - OOBA/SQH/SCDs  - AM labs  - [place]

## 2022-06-01 NOTE — DISCHARGE NOTE PROVIDER - NSDCCPCAREPLAN_GEN_ALL_CORE_FT
PRINCIPAL DISCHARGE DIAGNOSIS  Diagnosis: Acute appendicitis  Assessment and Plan of Treatment:       SECONDARY DISCHARGE DIAGNOSES  Diagnosis: 2019 novel coronavirus disease (COVID-19)  Assessment and Plan of Treatment:     Diagnosis: Obesity  Assessment and Plan of Treatment:

## 2022-06-01 NOTE — DISCHARGE NOTE PROVIDER - NSDCFUADDINST_GEN_ALL_CORE_FT
- Ambulate as tolerated  - Ice packs to the abdomen when not ambulating  - Please drink at least 2L water every 24 hours  - Take 2 extra strength tylenol + 1 advil (3 pills total) every 6 hours  - You have been prescribed oral antibiotics. Please be sure to complete the entire course as directed.   - You may take Oxycodone at bedtime or for breakthrough pain as needed, please take Colace while taking narcotic pain medications to avoid constipation  - Please continue a full liquid diet until you have a regular bowel movement at which time you may advance as tolerated  - Please take probiotics daily  - Contact your doctor or go to the ER for fever > 101.5, chills, nausea, vomiting, chest pain, shortness of breath, pain not controlled by medication or excessive bleeding.  - Please follow up with Dr. Hernandez in 1-2 weeks; you may call the office to make an appointment at your earliest convenience.

## 2022-06-01 NOTE — PROGRESS NOTE ADULT - ATTENDING COMMENTS
Abdomen soft, BS+, Flatus+, BM-, tolerating diet, wounds dry, clean, intact, D/C home with F/U in the office.

## 2022-06-01 NOTE — DISCHARGE NOTE NURSING/CASE MANAGEMENT/SOCIAL WORK - PATIENT PORTAL LINK FT
You can access the FollowMyHealth Patient Portal offered by [facility] by registering at the following website: [email or web address]. By joining [employer]’s FollowMyHealth portal, you will also be able to view your health information using other applications (apps) compatible with our system.

## 2022-06-01 NOTE — DISCHARGE NOTE PROVIDER - CARE PROVIDERS DIRECT ADDRESSES
[NL] : soft, non tender, non distended, normal bowel sounds, no hepatosplenomegaly [FreeTextEntry1] : active and well hydrated ,DirectAddress_Unknown

## 2022-06-01 NOTE — PROGRESS NOTE ADULT - SUBJECTIVE AND OBJECTIVE BOX
[facility] Post-Op Note, PCN:   Pre-Op Dx: Acute appendicitis  Procedure: Laparoscopic appendectomy  Surgeon: Dr. Hernandez    Subjective: Patient visited bedside post-op. Recovering from surgery performed by Dr. Hernandez 2 hours ago. Appears mildly dizzy and sleepy. Pain controlled by medication. Is on CLD and has tolerated sips of water so far. Voiding through Barbosa catheter. No flatus/BM yet. Denies fever, chills, palpitation, nausea, vomiting, shortness of breath, chest pain, or pain in extremities.    Vital Signs Last 24 Hrs  T(C): 36.2 (31 May 2022 19:59), Max: 36.6 (31 May 2022 05:17)  T(F): 97.1 (31 May 2022 19:59), Max: 97.9 (31 May 2022 12:43)  HR: 51 (31 May 2022 21:35) (46 - 65)  BP: 122/74 (31 May 2022 21:35) (114/75 - 148/93)  BP(mean): 95 (31 May 2022 21:35) (89 - 115)  RR: 15 (31 May 2022 21:35) (15 - 25)  SpO2: 100% (31 May 2022 21:35) (98% - 100%)    Physical Exam:  General: NAD, resting comfortably in bed  Pulmonary: Nonlabored breathing, no respiratory distress  Cardiovascular: NSR  Abdominal: Soft, non-distended, no tenderness or guarding. Laparoscopic incisions CDI  : Voiding clear urine via Foely  Extremities: WWP, normal strength, SCDs in place  Neuro: A/O x 3, CNs II-XII grossly intact, no focal deficits, normal motor/sensation  Pulses: palpable distal pulses      LABS:                        11.4   5.31  )-----------( 248      ( 31 May 2022 07:30 )             36.0     05-31    139  |  103  |  7   ----------------------------<  80  3.7   |  24  |  0.76    Ca    9.0      31 May 2022 07:30  Phos  3.4     05-31  Mg     1.9     05-31    TPro  7.4  /  Alb  4.0  /  TBili  0.5  /  DBili  x   /  AST  See Note  /  ALT  See Note  /  AlkPhos  See Note  05-30    PT/INR - ( 30 May 2022 15:52 )   PT: 14.0 sec;   INR: 1.17          PTT - ( 30 May 2022 15:52 )  PTT:30.3 sec  CAPILLARY BLOOD GLUCOSE        Urinalysis Basic - ( 30 May 2022 15:52 )    Color: Yellow / Appearance: Clear / SG: >=1.030 / pH: x  Gluc: x / Ketone: Trace mg/dL  / Bili: Negative / Urobili: 0.2 E.U./dL   Blood: x / Protein: NEGATIVE mg/dL / Nitrite: NEGATIVE   Leuk Esterase: NEGATIVE / RBC: x / WBC x   Sq Epi: x / Non Sq Epi: x / Bacteria: x      LIVER FUNCTIONS - ( 30 May 2022 17:07 )  Alb: 4.0 g/dL / Pro: 7.4 g/dL / ALK PHOS: See Note / ALT: See Note / AST: See Note / GGT: x           ABO Interpretation: O (05-31 @ 07:51)        Radiology and Additional Studies:    Assessment:26y Female s/p above procedure    Plan:  Pain/nausea control PRN  Home meds  Incentive spirometer/OOB/Ambulate  IVF, Diet:  AM labs  ToV? Barbosa?
STATUS POST:  Laparoscopic appendectomy    POST OPERATIVE DAY #: 1    SUBJECTIVE: Pt seen and examined by chief resident. Pt is doing well, resting comfortably on bed. Pain controlled.  -F/-BM. No nausea or vomiting. No complaints at this time.    Vital Signs Last 24 Hrs  T(C): 36.3 (01 Jun 2022 05:21), Max: 36.6 (31 May 2022 12:43)  T(F): 97.4 (01 Jun 2022 05:21), Max: 97.9 (31 May 2022 12:43)  HR: 59 (01 Jun 2022 05:21) (46 - 65)  BP: 118/77 (01 Jun 2022 05:21) (114/75 - 148/93)  BP(mean): 95 (31 May 2022 21:35) (89 - 115)  RR: 18 (01 Jun 2022 05:21) (15 - 25)  SpO2: 98% (01 Jun 2022 05:21) (98% - 100%)    I&O's Summary    31 May 2022 07:01  -  01 Jun 2022 07:00  --------------------------------------------------------  IN: 1930 mL / OUT: 1825 mL / NET: 105 mL        Physical Exam:  General Appearance: Appears well, NAD  Pulmonary: Nonlabored breathing, no respiratory distress  Abdomen: Soft, nondisteded, appropriate incisional tenderness, incisions clean dry and intact  Extremities: WWP, SCD's in place     LABS:                        13.3   7.81  )-----------( 240      ( 01 Jun 2022 05:30 )             42.3     06-01    136  |  102  |  7   ----------------------------<  98  4.6   |  20<L>  |  0.63    Ca    9.7      01 Jun 2022 05:30  Phos  4.2     06-01  Mg     2.1     06-01    TPro  7.4  /  Alb  4.0  /  TBili  0.5  /  DBili  x   /  AST  See Note  /  ALT  See Note  /  AlkPhos  See Note  05-30    PT/INR - ( 30 May 2022 15:52 )   PT: 14.0 sec;   INR: 1.17          PTT - ( 30 May 2022 15:52 )  PTT:30.3 sec  Urinalysis Basic - ( 30 May 2022 15:52 )    Color: Yellow / Appearance: Clear / SG: >=1.030 / pH: x  Gluc: x / Ketone: Trace mg/dL  / Bili: Negative / Urobili: 0.2 E.U./dL   Blood: x / Protein: NEGATIVE mg/dL / Nitrite: NEGATIVE   Leuk Esterase: NEGATIVE / RBC: x / WBC x   Sq Epi: x / Non Sq Epi: x / Bacteria: x      
SUBJECTIVE: Patient seen and examined bedside. Pt reports feeling well this morning and states her abdominal pain has improved since admission. Denies nausea or vomiting. Remains NPO. Denies flatus or BMs this AM.     heparin   Injectable 5000 Unit(s) SubCutaneous every 8 hours  piperacillin/tazobactam IVPB.. 3.375 Gram(s) IV Intermittent every 6 hours      Vital Signs Last 24 Hrs  T(C): 36.6 (31 May 2022 05:17), Max: 37.1 (30 May 2022 18:01)  T(F): 97.8 (31 May 2022 05:17), Max: 98.7 (30 May 2022 18:01)  HR: 56 (31 May 2022 05:17) (56 - 68)  BP: 129/76 (31 May 2022 05:17) (114/71 - 129/86)  BP(mean): --  RR: 16 (31 May 2022 05:17) (16 - 18)  SpO2: 99% (31 May 2022 05:17) (96% - 100%)  I&O's Detail    30 May 2022 07:01  -  31 May 2022 07:00  --------------------------------------------------------  IN:    IV PiggyBack: 100 mL    Lactated Ringers: 660 mL  Total IN: 760 mL    OUT:    Oral Fluid: 0 mL    Voided (mL): 300 mL  Total OUT: 300 mL    Total NET: 460 mL          General: NAD, resting comfortably in bed  C/V: NSR  Pulm: Nonlabored breathing, no respiratory distress  Abd: soft, mildly distended, TTP in RLQ, no rebound, no guarding  Extrem: WWP, no edema, SCDs in place        LABS:                        13.0   8.25  )-----------( 264      ( 30 May 2022 15:52 )             40.1     05-30    138  |  104  |  8   ----------------------------<  93  4.3   |  24  |  0.66    Ca    9.2      30 May 2022 18:35    TPro  7.4  /  Alb  4.0  /  TBili  0.5  /  DBili  x   /  AST  See Note  /  ALT  See Note  /  AlkPhos  See Note  05-30    PT/INR - ( 30 May 2022 15:52 )   PT: 14.0 sec;   INR: 1.17          PTT - ( 30 May 2022 15:52 )  PTT:30.3 sec  Urinalysis Basic - ( 30 May 2022 15:52 )    Color: Yellow / Appearance: Clear / SG: >=1.030 / pH: x  Gluc: x / Ketone: Trace mg/dL  / Bili: Negative / Urobili: 0.2 E.U./dL   Blood: x / Protein: NEGATIVE mg/dL / Nitrite: NEGATIVE   Leuk Esterase: NEGATIVE / RBC: x / WBC x   Sq Epi: x / Non Sq Epi: x / Bacteria: x        RADIOLOGY & ADDITIONAL STUDIES:

## 2022-06-06 DIAGNOSIS — K35.80 UNSPECIFIED ACUTE APPENDICITIS: ICD-10-CM

## 2022-06-06 DIAGNOSIS — U07.1 COVID-19: ICD-10-CM

## 2022-06-06 DIAGNOSIS — Z79.899 OTHER LONG TERM (CURRENT) DRUG THERAPY: ICD-10-CM

## 2022-06-06 DIAGNOSIS — E66.9 OBESITY, UNSPECIFIED: ICD-10-CM

## 2022-06-06 DIAGNOSIS — K35.890 OTHER ACUTE APPENDICITIS WITHOUT PERFORATION OR GANGRENE: ICD-10-CM

## 2022-06-10 LAB — SURGICAL PATHOLOGY STUDY: SIGNIFICANT CHANGE UP

## 2024-06-26 NOTE — CONSULT NOTE ADULT - CONSULT REASON
Medication:  passed protocol.   Last office visit date: 5-7-2024  Next appointment scheduled?: Yes   Number of refills given: 3   Internal Medicine Co-management

## (undated) DEVICE — SOL IRR BAG NS 0.9% 3000ML

## (undated) DEVICE — SUT PDS II 0 18" ENDOLOOP LIGATURE

## (undated) DEVICE — POSITIONER FOAM EGG CRATE ULNAR 2PCS (PINK)

## (undated) DEVICE — SUT MONOCRYL 4-0 18" PS-2

## (undated) DEVICE — PACK GENERAL LAPAROSCOPY

## (undated) DEVICE — TROCAR COVIDIEN VERSAONE FIXATION CANNULA 5MM

## (undated) DEVICE — WARMING BLANKET UPPER ADULT

## (undated) DEVICE — DRSG DERMABOND 0.7ML

## (undated) DEVICE — TROCAR APPLIED MEDICAL KII BALLOON BLUNT TIP 12MM X 100MM

## (undated) DEVICE — TROCAR COVIDIEN VERSAPORT BLADELESS OPTICAL 5MM STANDARD

## (undated) DEVICE — VENODYNE/SCD SLEEVE CALF MEDIUM

## (undated) DEVICE — GLV 7.5 SENSICARE W ALOE

## (undated) DEVICE — ENDOCATCH 10MM SPECIMEN POUCH

## (undated) DEVICE — DRSG STERISTRIPS 0.5 X 4"

## (undated) DEVICE — SUT MAXON 0 30" HGU-46

## (undated) DEVICE — TUBING STRYKER PNEUMOSURE HI FLOW INSUFFLATOR

## (undated) DEVICE — Device

## (undated) DEVICE — SUT VICRYL 0 27" UR-6